# Patient Record
Sex: MALE | Race: WHITE | NOT HISPANIC OR LATINO | Employment: OTHER | URBAN - METROPOLITAN AREA
[De-identification: names, ages, dates, MRNs, and addresses within clinical notes are randomized per-mention and may not be internally consistent; named-entity substitution may affect disease eponyms.]

---

## 2023-02-18 ENCOUNTER — HOSPITAL ENCOUNTER (EMERGENCY)
Facility: MEDICAL CENTER | Age: 23
End: 2023-02-18
Attending: EMERGENCY MEDICINE
Payer: COMMERCIAL

## 2023-02-18 VITALS
HEIGHT: 72 IN | WEIGHT: 165.57 LBS | SYSTOLIC BLOOD PRESSURE: 118 MMHG | OXYGEN SATURATION: 97 % | BODY MASS INDEX: 22.43 KG/M2 | DIASTOLIC BLOOD PRESSURE: 74 MMHG | HEART RATE: 82 BPM | RESPIRATION RATE: 18 BRPM | TEMPERATURE: 97.4 F

## 2023-02-18 DIAGNOSIS — L72.3 SEBACEOUS CYST: ICD-10-CM

## 2023-02-18 DIAGNOSIS — F41.9 ANXIETY: ICD-10-CM

## 2023-02-18 LAB — TSH SERPL DL<=0.005 MIU/L-ACNC: 0.97 UIU/ML (ref 0.38–5.33)

## 2023-02-18 PROCEDURE — 700102 HCHG RX REV CODE 250 W/ 637 OVERRIDE(OP): Performed by: EMERGENCY MEDICINE

## 2023-02-18 PROCEDURE — A9270 NON-COVERED ITEM OR SERVICE: HCPCS | Performed by: EMERGENCY MEDICINE

## 2023-02-18 PROCEDURE — 36415 COLL VENOUS BLD VENIPUNCTURE: CPT

## 2023-02-18 PROCEDURE — 99284 EMERGENCY DEPT VISIT MOD MDM: CPT

## 2023-02-18 PROCEDURE — 84443 ASSAY THYROID STIM HORMONE: CPT

## 2023-02-18 RX ORDER — LORAZEPAM 1 MG/1
2 TABLET ORAL ONCE
Status: COMPLETED | OUTPATIENT
Start: 2023-02-18 | End: 2023-02-18

## 2023-02-18 RX ORDER — LORAZEPAM 0.5 MG/1
0.5 TABLET ORAL EVERY 4 HOURS PRN
Qty: 20 TABLET | Refills: 0 | Status: SHIPPED | OUTPATIENT
Start: 2023-02-18 | End: 2023-02-23

## 2023-02-18 RX ORDER — LORAZEPAM 0.5 MG/1
0.5 TABLET ORAL EVERY 4 HOURS PRN
Qty: 20 TABLET | Refills: 0 | Status: SHIPPED | OUTPATIENT
Start: 2023-02-18 | End: 2023-02-18 | Stop reason: SDUPTHER

## 2023-02-18 RX ADMIN — LORAZEPAM 2 MG: 1 TABLET ORAL at 12:39

## 2023-02-18 NOTE — ED PROVIDER NOTES
"ED Provider Note    CHIEF COMPLAINT  Chief Complaint   Patient presents with    Anxiety     \"Out of control\" for the past 2 days per pt. Father. Pt. Denies taking meds for same. Denies SI/HI.    Lump     L neck and L upper eye orbit x1 year per pt.     HPI/ROS    OUTSIDE HISTORIAN(S):  Parent Father is at bedside relating the patient's history and review of systems of being extremely amped up and anxious.    Kamlesh Ward is a 22 y.o. male who presents with his dad with complaint of anxiety.  The patient does have a history anxiety in the past and was on Prozac but has not been on it for several years.  He states that recently has had attacks of anxiety increasing severity and frequency.  He does live with his mother in [place] and part-time here with his dad.  His dad is giving his most of the information stating the last 2 days the patient's had severe anxiety and feels completely amped up, states that he cannot sleep.  The father did give the patient Xanax 0.5 mg yesterday as well as go throat slightly.  The patient also complains of a cyst on the back of his neck he has had for years and is concerned about that as well as cyst in his left eye.  The denies pain, visual deficit, tearing, or recent fever, shakes, chills, sweats.  Denies drug use, alcohol use frequently but states that he utilizes CBD.    PAST MEDICAL HISTORY   has a past medical history of Depression.    SURGICAL HISTORY  patient denies any surgical history    FAMILY HISTORY  History reviewed. No pertinent family history.    SOCIAL HISTORY  Social History     Tobacco Use    Smoking status: Never    Smokeless tobacco: Never   Substance and Sexual Activity    Alcohol use: Yes     Comment: occ    Drug use: Not Currently    Sexual activity: Not on file       CURRENT MEDICATIONS  Home Medications       Reviewed by Diana Beebe R.N. (Registered Nurse) on 02/18/23 at 1129  Med List Status: Not Addressed     Medication Last Dose Status      "   Patient Archie Taking any Medications                           ALLERGIES  No Known Allergies    PHYSICAL EXAM  VITAL SIGNS: /74   Pulse 82   Temp 36.3 °C (97.4 °F) (Temporal)   Resp 18   Ht 1.829 m (6')   Wt 75.1 kg (165 lb 9.1 oz)   SpO2 97%   BMI 22.45 kg/m²      Nursing notes and vitals reviewed.  Constitutional: Well developed, Well nourished, No acute distress, Non-toxic appearance.   Eyes: PERRLA, EOMI, I do not appreciate any fluid collection, cyst or abscess in the patient's eyes, conjunctiva normal, No discharge.   Neck: Slight cystic structure in the left lateral posterior neck, no tenderness, no fluctuance, no erythema, no edema  HENT: Normocephalic, Atraumatic, moist mucous membranes, no facial edema   Cardiovascular: Normal heart rate, Normal rhythm, No murmurs, No rubs, No gallops.      Skin: Warm, Dry, No erythema, No rash.   Extremities: No deformity, no edema, good range of motion range of motion upper lower extremes bilaterally  Psychiatric: Pressured speech, anxious affect, denies suicidal homicidal ideation,    DIAGNOSTIC STUDIES / PROCEDURES      LABS  Results for orders placed or performed during the hospital encounter of 02/18/23   TSH WITH REFLEX TO FT4   Result Value Ref Range    TSH 0.966 0.380 - 5.330 uIU/mL       COURSE & MEDICAL DECISION MAKING    ED Observation Status? No; Patient does not meet criteria for ED Observation.     INITIAL ASSESSMENT, COURSE AND PLAN  Care Narrative: With complaint of anxiety.  For the anxiety, the patient received 2 mg of Ativan orally here in the emergency department.  On reevaluation he is much more subdued and states he is feeling much better would like to go home.  He did receive a prescription for Ativan for breakthrough anxiety and for him to follow-up with outpatient psychiatry.  He does have an appointment in [place] for psychiatry as well.  The patient's laboratory values are negative for acute abnormality such as thyroid  abnormality he recently had blood work done and they are all negative.  Prior to discharge, the patient was instructed about his Sistine he will be following up with outpatient possible surgical intervention.    ADDITIONAL PROBLEM LIST      FINAL DIAGNOSIS  1. Anxiety Active   2. Sebaceous cyst Active     DISPOSITION:  Patient will be discharged home in stable condition.    FOLLOW UP:  [facility], Emergency Dept  [address]  [place] [address]  [phone]    If symptoms worsen    Cristy Messina M.D.  [address]  [facility] [address]  [phone]    Schedule an appointment as soon as possible for a visit in 1 week      David Aviles M.D.  [address]  [place] [address]  [phone]    Schedule an appointment as soon as possible for a visit   For your cyst and possible removal    Electronically signed by: Harshal Lucio D.O., 2/18/2023 12:22 PM